# Patient Record
Sex: FEMALE | Race: WHITE
[De-identification: names, ages, dates, MRNs, and addresses within clinical notes are randomized per-mention and may not be internally consistent; named-entity substitution may affect disease eponyms.]

---

## 2018-01-18 ENCOUNTER — HOSPITAL ENCOUNTER (EMERGENCY)
Dept: HOSPITAL 25 - UCCORT | Age: 53
Discharge: HOME | End: 2018-01-18
Payer: COMMERCIAL

## 2018-01-18 VITALS — SYSTOLIC BLOOD PRESSURE: 137 MMHG | DIASTOLIC BLOOD PRESSURE: 81 MMHG

## 2018-01-18 DIAGNOSIS — Z88.1: ICD-10-CM

## 2018-01-18 DIAGNOSIS — F17.210: ICD-10-CM

## 2018-01-18 DIAGNOSIS — J18.9: Primary | ICD-10-CM

## 2018-01-18 PROCEDURE — 87502 INFLUENZA DNA AMP PROBE: CPT

## 2018-01-18 PROCEDURE — 99202 OFFICE O/P NEW SF 15 MIN: CPT

## 2018-01-18 PROCEDURE — G0463 HOSPITAL OUTPT CLINIC VISIT: HCPCS

## 2018-01-18 NOTE — UC
Throat Pain/Nasal Adrian HPI





- HPI Summary


HPI Summary: 





52 year with cough. x3 weeks pt states she has been sick with sinus pressure, 

green phlegm that she expells, today has body aches. She has had some SOB with 

the cough and a rattle in her lungs last night. also concerned about flu as she 

has grandchildren. the body aches started in the past few hours. 


[ End ]





- History of Current Complaint


Chief Complaint: UCGeneralIllness


Stated Complaint: FLU SXS


Time Seen by Provider: 01/18/18 14:37


Hx Obtained From: Patient


Hx Last Menstrual Period: unknown   due to novasure


Pregnant?: No


Onset/Duration: Sudden Onset, Gradual Onset


Severity: Moderate


Cough: Productive


Associated Signs & Symptoms: Positive: Wheezing





- Allergies/Home Medications


Allergies/Adverse Reactions: 


 Allergies











Allergy/AdvReac Type Severity Reaction Status Date / Time


 


Dextromethorphan Allergy Severe Rash Verified 01/18/18 14:19





[From Tussin-DM]     


 


Guaifenesin [From Tussin-DM] Allergy Severe Rash Verified 01/18/18 14:19


 


Amoxicillin Allergy  facial Verified 01/18/18 14:20





   redness  











Home Medications: 


 Home Medications





Pseudoephedrine-Guaifenesin [Mucinex D  mg]  01/18/18 [History]











PMH/Surg Hx/FS Hx/Imm Hx


Previously Healthy: Yes





- Surgical History


Surgical History: Yes


Surgery Procedure, Year, and Place: [facility]





- Family History


Known Family History: Positive: Other - dad - GI cancer





- Social History


Occupation: Employed Full-time


Lives: With Family


Alcohol Use: None


Substance Use Type: None


Smoking Status (MU): Heavy Every Day Tobacco Smoker





- Immunization History


Most Recent Influenza Vaccination: not current





Review of Systems


Constitutional: Chills, Fatigue


ENT: Dental Pain, Sore Throat, Ear Ache, Nasal Discharge, Sinus Congestion, 

Sinus Pain/Tenderness


Respiratory: Shortness Of Breath, Cough


Musculoskeletal: Myalgia


Is Patient Immunocompromised?: No


All Other Systems Reviewed And Are Negative: Yes





Physical Exam


Triage Information Reviewed: Yes


Appearance: Well-Appearing, No Pain Distress, Well-Nourished


Vital Signs: 


 Initial Vital Signs











Temp  98.3 F   01/18/18 14:14


 


Pulse  88   01/18/18 14:14


 


Resp  18   01/18/18 14:14


 


BP  137/81   01/18/18 14:14


 


Pulse Ox  99   01/18/18 14:14











Vital Signs Reviewed: Yes


Eye Exam: Normal


ENT Exam: Normal


ENT: Positive: Pharynx normal, Nasal congestion, Nasal drainage, TM dull


Dental Exam: Normal


Neck exam: Normal


Neck: Positive: 1


Respiratory Exam: Normal


Respiratory: Positive: Crackles - B/L Lower lungs, Wheezing, Expiration, 

Inspiration


Cardiovascular Exam: Normal


Musculoskeletal Exam: Normal


Neurological Exam: Normal


Psychological Exam: Normal


Skin Exam: Normal





Throat Pain/Nasal Course/Dx





- Course


Course Of Treatment: pneumonia -- start levoquin -- discussed SE like GI and 

tendon rupture she is aware . RTo if any concerns


Assessment/Plan: pneumonia





- Differential Dx/Diagnosis


Differential Diagnosis/HQI/PQRI: Otitis Media, Pharyngitis, Sinusitis, URI


Provider Diagnoses: Pneumonia





Discharge





- Discharge Plan


Condition: Good


Disposition: HOME


Prescriptions: 


Levofloxacin TAB* [Levaquin TAB*] 500 mg PO DAILY #10 tab


Patient Education Materials:  Pneumonia (ED)


Referrals: 


[facility] [Primary Care Provider] - 4 Days


Additional Instructions: 


Your flu swab was negative today